# Patient Record
Sex: MALE | Race: WHITE | NOT HISPANIC OR LATINO
[De-identification: names, ages, dates, MRNs, and addresses within clinical notes are randomized per-mention and may not be internally consistent; named-entity substitution may affect disease eponyms.]

---

## 2017-05-26 ENCOUNTER — APPOINTMENT (OUTPATIENT)
Dept: DERMATOLOGY | Facility: CLINIC | Age: 82
End: 2017-05-26

## 2018-04-20 ENCOUNTER — APPOINTMENT (OUTPATIENT)
Dept: DERMATOLOGY | Facility: CLINIC | Age: 83
End: 2018-04-20
Payer: MEDICARE

## 2018-04-20 PROCEDURE — 99213 OFFICE O/P EST LOW 20 MIN: CPT | Mod: 25

## 2018-04-20 PROCEDURE — 17000 DESTRUCT PREMALG LESION: CPT

## 2018-10-22 ENCOUNTER — APPOINTMENT (OUTPATIENT)
Dept: DERMATOLOGY | Facility: CLINIC | Age: 83
End: 2018-10-22

## 2019-01-04 ENCOUNTER — APPOINTMENT (OUTPATIENT)
Dept: DERMATOLOGY | Facility: CLINIC | Age: 84
End: 2019-01-04
Payer: MEDICARE

## 2019-01-04 PROCEDURE — 17000 DESTRUCT PREMALG LESION: CPT

## 2019-01-04 PROCEDURE — 99213 OFFICE O/P EST LOW 20 MIN: CPT | Mod: 25

## 2019-01-04 PROCEDURE — 17003 DESTRUCT PREMALG LES 2-14: CPT

## 2019-07-12 ENCOUNTER — APPOINTMENT (OUTPATIENT)
Dept: DERMATOLOGY | Facility: CLINIC | Age: 84
End: 2019-07-12
Payer: MEDICARE

## 2019-07-12 PROCEDURE — 17000 DESTRUCT PREMALG LESION: CPT | Mod: 59

## 2019-07-12 PROCEDURE — 99214 OFFICE O/P EST MOD 30 MIN: CPT | Mod: 25

## 2019-07-12 PROCEDURE — 11102 TANGNTL BX SKIN SINGLE LES: CPT

## 2019-08-08 ENCOUNTER — APPOINTMENT (OUTPATIENT)
Dept: SURGICAL ONCOLOGY | Facility: CLINIC | Age: 84
End: 2019-08-08
Payer: MEDICARE

## 2019-08-08 VITALS
DIASTOLIC BLOOD PRESSURE: 93 MMHG | HEIGHT: 72 IN | SYSTOLIC BLOOD PRESSURE: 182 MMHG | BODY MASS INDEX: 28.34 KG/M2 | HEART RATE: 94 BPM | WEIGHT: 209.25 LBS

## 2019-08-08 DIAGNOSIS — Z87.891 PERSONAL HISTORY OF NICOTINE DEPENDENCE: ICD-10-CM

## 2019-08-08 PROCEDURE — 99214 OFFICE O/P EST MOD 30 MIN: CPT

## 2019-08-08 RX ORDER — LIDOCAINE HCL 4 %
250 MCG CREAM (GRAM) TOPICAL
Refills: 0 | Status: ACTIVE | COMMUNITY

## 2019-08-08 RX ORDER — CHLORHEXIDINE GLUCONATE 4 %
LIQUID (ML) TOPICAL
Refills: 0 | Status: ACTIVE | COMMUNITY

## 2019-08-08 RX ORDER — CRANBERRY FRUIT EXTRACT 200 MG
CAPSULE ORAL
Refills: 0 | Status: ACTIVE | COMMUNITY

## 2019-08-08 RX ORDER — BETHANECHOL CHLORIDE 25 MG/1
25 TABLET ORAL
Refills: 0 | Status: ACTIVE | COMMUNITY

## 2019-08-08 RX ORDER — ASCORBIC ACID 500 MG
500 POWDER IN PACKET (EA) ORAL
Refills: 0 | Status: ACTIVE | COMMUNITY

## 2019-08-08 RX ORDER — FINASTERIDE 5 MG/1
5 TABLET, FILM COATED ORAL
Refills: 0 | Status: ACTIVE | COMMUNITY

## 2019-09-06 ENCOUNTER — APPOINTMENT (OUTPATIENT)
Dept: SURGICAL ONCOLOGY | Facility: CLINIC | Age: 84
End: 2019-09-06
Payer: MEDICARE

## 2019-09-06 VITALS
SYSTOLIC BLOOD PRESSURE: 154 MMHG | BODY MASS INDEX: 28.31 KG/M2 | HEART RATE: 80 BPM | DIASTOLIC BLOOD PRESSURE: 86 MMHG | WEIGHT: 209 LBS | HEIGHT: 72 IN

## 2019-09-06 PROCEDURE — 14000 TIS TRNFR TRUNK 10 SQ CM/<: CPT

## 2019-09-06 NOTE — PROCEDURE
[Excision Of Lesion] : excision of lesion [Risks] : risks [Patient] : patient [Benefits] : benefits [Alternatives] : alternatives [Infection] : infection risk [Bleeding] : bleeding risk [___ ml Inj] : [unfilled] ~Uml [Consent Obtained] : written consent was obtained prior to the procedure [1%] : 1% [With Epi] : with epinephrine [Excisional: Margin ___mm] : the lesion was excised with a  [unfilled] ~Umm margin in an elliptical fashion [Simple Sutures] : simple sutures were used for the skin closure [Cautery] : cautery [Vicryl] : Vicryl suture(s) [Size: ___-0] : [unfilled]-0 [Nylon] : nylon suture(s) [Interrupted] : interrupted [Dressing Applied] : a sterile dressing was placed [Sent to Pathology] : the excised lesion was place in buffered formalin and sent for pathology [Tolerated Well] : the patient tolerated the procedure well [No Complications] : there were no complications [___ Week(s)] : in [unfilled] week(s) [de-identified] : invasive melanoma [FreeTextEntry5] : dorsal right forearm - 4 cm x 9 cm  [de-identified] : Chloraprep [de-identified] : 3 specimens sent to pathology [de-identified] : skin flaps raised medially and laterally - to ensure a tension-free closure of the dermis and epidermis

## 2019-09-06 NOTE — ASSESSMENT
[FreeTextEntry1] : 89 year old man s/p WLE of dorsal right forearm invasive melanoma 9/6/19. Tolerated procedure well, without complications.\par \par Right distal dorsal forearm shave biopsy- Invasive MELANOMA, favor lentigo maligna type, Breslow thickness 0.4 mm, non ulcerated, with regression changes. Melanoma in situ extends to the peripheral tissue edges of the specimen. Actinic keratosis. Less than one mitotic figure/mm2. Tumor stage: pT1a \par \par \par Plan:\par 1) Follow-up 9/26/19 for suture removal and pathology results.

## 2019-09-26 ENCOUNTER — APPOINTMENT (OUTPATIENT)
Dept: SURGICAL ONCOLOGY | Facility: CLINIC | Age: 84
End: 2019-09-26
Payer: MEDICARE

## 2019-09-26 VITALS
HEIGHT: 72 IN | SYSTOLIC BLOOD PRESSURE: 170 MMHG | WEIGHT: 209 LBS | DIASTOLIC BLOOD PRESSURE: 80 MMHG | BODY MASS INDEX: 28.31 KG/M2

## 2019-09-26 PROCEDURE — 99024 POSTOP FOLLOW-UP VISIT: CPT

## 2019-09-26 NOTE — HISTORY OF PRESENT ILLNESS
[de-identified] : 89 year old male presents for an initial post op visit.\par Initially consulted 8/8/19 for newly diagnosed right forearm melanoma, referred by Dr. Torres. \par He underwent a routine dermatological exam on 7/12/19 and Dr. Torres noticed a concerning mole with a surrounding brown patch and patient underwent a shave biopsy with the following results: \par \par Right distal dorsal forearm shave biopsy- Invasive MELANOMA, favor lentigo maligna type, Breslow thickness 0.4 mm, non ulcerated, with regression changes.  Melanoma in situ extends to the peripheral tissue edges of the specimen.  Actinic keratosis.  Less than one mitotic figure/mm2.  Tumor stage: pT1a \par \par Mr. PRAJAPATI reports a longstanding history of sun exposure without the use of sunblock. History of sunburns.  Denies any other concerning lesions or masses. Denies bleeding or pruritic moles. Denies pain or constitutional symptoms.\par \par History significant for an early stage melanoma of his right neck >30 years ago. \par \par PMH of strabismus as a child, cataract surgery 2012, Quadriceps repair @ age 87, Prostate surgery in 1996 for a benign growth, Tonsillectomy @ age 2.  Never a smoker.  Drinks daily martini and/or glass of wine. \par \par Denies family history of skin cancer or melanoma.\par \par \par INTERVAL HISTORY:\par *** In office procedure 9/6/19***- S/p WLE of right distal forearm melanoma.  \par FINAL PATHOLOGY revealed melanoma in situ, broad, with regression, negative margins.  Maximum tumor thickness measuring 0.4 mm. \par \par 9/26/19 - Doing well.  Had wound infection with redness, treated with antibiotics with resolution.

## 2019-09-26 NOTE — CONSULT LETTER
[Dear  ___] : Dear  [unfilled], [Please see my note below.] : Please see my note below. [Courtesy Letter:] : I had the pleasure of seeing your patient, [unfilled], in my office today. [Consult Closing:] : Thank you very much for allowing me to participate in the care of this patient.  If you have any questions, please do not hesitate to contact me. [FreeTextEntry3] : Milind Gong MD, MPH, FACS\par Surgical Oncology\par [facility] [place]\par Associate Professor of Surgery\par Department of General Surgery\par [employer] [facility]  [Sincerely,] : Sincerely,

## 2019-09-26 NOTE — PHYSICAL EXAM
[Normal] : supple, no neck mass and thyroid not enlarged [Normal Neck Lymph Nodes] : normal neck lymph nodes  [Normal Supraclavicular Lymph Nodes] : normal supraclavicular lymph nodes [Normal Groin Lymph Nodes] : normal groin lymph nodes [Normal Axillary Lymph Nodes] : normal axillary lymph nodes [Normal] : grossly intact [de-identified] : right forearm incision c/d/i; nylon sutures removed; no cellulitis or fluctuance

## 2019-09-26 NOTE — ASSESSMENT
[FreeTextEntry1] : IMP:\par Right forearm T1 (0.4 mm) melanoma\par *** In office procedure 9/6/19***- S/p WLE of right distal forearm melanoma.  \par FINAL PATHOLOGY revealed melanoma in situ, broad, with regression, negative margins.  Maximum tumor thickness measuring 0.4 mm. \par \par PLAN:\par Continue f/u with dermatology\par RTO 3 months \par \par \par \par

## 2019-10-14 NOTE — HISTORY OF PRESENT ILLNESS
[de-identified] : 89 year old male presents for an initial consultation for newly diagnosed right forearm melanoma, referred by Dr. Torres. \par He underwent a routine dermatological exam on 7/12/19 and Dr. Torres noticed a concerning mole with a surrounding brown patch and patient underwent a shave biopsy with the following results: \par \par Right distal dorsal forearm shave biopsy- Invasive MELANOMA, favor lentigo maligna type, Breslow thickness 0.4 mm, non ulcerated, with regression changes.  Melanoma in situ extends to the peripheral tissue edges of the specimen.  Actinic keratosis.  Less than one mitotic figure/mm2.  Tumor stage: pT1a \par \par Mr. PRAJAPATI reports a longstanding history of sun exposure without the use of sunblock. History of sunburns.  Denies any other concerning lesions or masses. Denies bleeding or pruritic moles. Denies pain or constitutional symptoms.\par \par History significant for an early stage melanoma of his right neck >30 years ago. \par \par PMH of strabismus as a child, cataract surgery 2012, Quadriceps repair @ age 87, Prostate surgery in 1996 for a benign growth, Tonsillectomy @ age 2.  Never a smoker.  Drinks daily martini and/or glass of wine. \par \par Denies family history of skin cancer or melanoma.

## 2019-10-14 NOTE — ASSESSMENT
[FreeTextEntry1] : IMP:\par Right forearm T1 (0.4 mm) melanoma\par \par PLAN:\par WLE of right forearm melanoma.  We discussed the risks, benefits, and alternatives of the procedure with the patient, he expressed understanding and agree to proceed.

## 2019-10-14 NOTE — REVIEW OF SYSTEMS
[Patient Intake Form Reviewed] : Patient intake form was reviewed [Negative] : Heme/Lymph [de-identified] : see HPI

## 2019-10-14 NOTE — PHYSICAL EXAM
[Normal] : supple, no neck mass and thyroid not enlarged [Normal Neck Lymph Nodes] : normal neck lymph nodes  [Normal Supraclavicular Lymph Nodes] : normal supraclavicular lymph nodes [Normal Axillary Lymph Nodes] : normal axillary lymph nodes [Normal] : oriented to person, place and time, with appropriate affect [de-identified] : healing right forearm shave biopsy site with scabbing

## 2020-01-17 ENCOUNTER — APPOINTMENT (OUTPATIENT)
Dept: DERMATOLOGY | Facility: CLINIC | Age: 85
End: 2020-01-17
Payer: MEDICARE

## 2020-01-17 PROCEDURE — 17000 DESTRUCT PREMALG LESION: CPT

## 2020-01-17 PROCEDURE — 17003 DESTRUCT PREMALG LES 2-14: CPT

## 2020-01-17 PROCEDURE — 99214 OFFICE O/P EST MOD 30 MIN: CPT | Mod: 25

## 2020-01-23 ENCOUNTER — APPOINTMENT (OUTPATIENT)
Dept: SURGICAL ONCOLOGY | Facility: CLINIC | Age: 85
End: 2020-01-23
Payer: MEDICARE

## 2020-01-23 VITALS
HEART RATE: 76 BPM | WEIGHT: 214.04 LBS | TEMPERATURE: 97.9 F | DIASTOLIC BLOOD PRESSURE: 82 MMHG | SYSTOLIC BLOOD PRESSURE: 155 MMHG | OXYGEN SATURATION: 95 % | BODY MASS INDEX: 28.99 KG/M2 | HEIGHT: 72 IN

## 2020-01-23 DIAGNOSIS — C43.61 MALIGNANT MELANOMA OF RIGHT UPPER LIMB, INCLUDING SHOULDER: ICD-10-CM

## 2020-01-23 PROCEDURE — 99214 OFFICE O/P EST MOD 30 MIN: CPT

## 2020-01-23 NOTE — HISTORY OF PRESENT ILLNESS
[de-identified] : 89 year old male presents for a 3 month follow up visit. \par Initially consulted 8/8/19 for newly diagnosed right forearm melanoma, referred by Dr. Torres. \par He underwent a routine dermatological exam on 7/12/19 and Dr. Torres noticed a concerning mole with a surrounding brown patch and patient underwent a shave biopsy with the following results: \par \par Right distal dorsal forearm shave biopsy- Invasive MELANOMA, favor lentigo maligna type, Breslow thickness 0.4 mm, non ulcerated, with regression changes.  Melanoma in situ extends to the peripheral tissue edges of the specimen.  Actinic keratosis.  Less than one mitotic figure/mm2.  Tumor stage: pT1a \par \par Mr. PRAJAPATI reports a longstanding history of sun exposure without the use of sunblock. History of sunburns.  Denies any other concerning lesions or masses. Denies bleeding or pruritic moles. Denies pain or constitutional symptoms.\par \par History significant for an early stage melanoma of his right neck >30 years ago. \par \par PMH of strabismus as a child, cataract surgery 2012, Quadriceps repair @ age 87, Prostate surgery in 1996 for a benign growth, Tonsillectomy @ age 2.  Never a smoker.  Drinks daily martini and/or glass of wine. \par \par Denies family history of skin cancer or melanoma.\par \par \par INTERVAL HISTORY:\par *** In office procedure 9/6/19***- S/p WLE of right distal forearm melanoma.  \par FINAL PATHOLOGY revealed melanoma in situ, broad, with regression, negative margins.  Maximum tumor thickness measuring 0.4 mm. \par \par 9/26/19 - Doing well.  Had wound infection with redness, treated with antibiotics with resolution.\par \par 1/23/2020- Continues f/u with Dr. Torres and was last seen on 1/17/2020 with no new biopsies.  Denies new skin lesions or masses. No bleeding or pruritic moles. Feeling well with no complaints.

## 2020-01-23 NOTE — ASSESSMENT
[FreeTextEntry1] : IMP:\par Right forearm T1 (0.4 mm) melanoma\par *** In office procedure 9/6/19***- S/p WLE of right distal forearm melanoma.  \par FINAL PATHOLOGY revealed melanoma in situ, broad, with regression, negative margins.  Maximum tumor thickness measuring 0.4 mm. \par No evidence of recurrence \par \par PLAN:\par Continue f/u with dermatology\par RTO 6 months\par \par \par

## 2020-01-23 NOTE — PHYSICAL EXAM
[Normal] : supple, no neck mass and thyroid not enlarged [Normal Neck Lymph Nodes] : normal neck lymph nodes  [Normal Axillary Lymph Nodes] : normal axillary lymph nodes [Normal Supraclavicular Lymph Nodes] : normal supraclavicular lymph nodes [Normal] : grossly intact [de-identified] : well healed right forearm incision with no evidence of recurrence

## 2020-01-23 NOTE — CONSULT LETTER
[Dear  ___] : Dear  [unfilled], [Courtesy Letter:] : I had the pleasure of seeing your patient, [unfilled], in my office today. [Please see my note below.] : Please see my note below. [Consult Closing:] : Thank you very much for allowing me to participate in the care of this patient.  If you have any questions, please do not hesitate to contact me. [Sincerely,] : Sincerely, [FreeTextEntry3] : Milind Gong MD, MPH, FACS\par Surgical Oncology\par [facility] [place]\par Associate Professor of Surgery\par Department of General Surgery\par [employer] at [facility]

## 2020-07-17 ENCOUNTER — APPOINTMENT (OUTPATIENT)
Dept: DERMATOLOGY | Facility: CLINIC | Age: 85
End: 2020-07-17

## 2020-10-15 ENCOUNTER — APPOINTMENT (OUTPATIENT)
Dept: ORTHOPEDIC SURGERY | Facility: CLINIC | Age: 85
End: 2020-10-15
Payer: MEDICARE

## 2020-10-15 VITALS
HEIGHT: 72 IN | BODY MASS INDEX: 28.99 KG/M2 | SYSTOLIC BLOOD PRESSURE: 150 MMHG | WEIGHT: 214 LBS | HEART RATE: 77 BPM | DIASTOLIC BLOOD PRESSURE: 78 MMHG

## 2020-10-15 DIAGNOSIS — M81.0 AGE-RELATED OSTEOPOROSIS W/OUT CURRENT PATHOLOGICAL FRACTURE: ICD-10-CM

## 2020-10-15 DIAGNOSIS — M47.816 SPONDYLOSIS W/OUT MYELOPATHY OR RADICULOPATHY, LUMBAR REGION: ICD-10-CM

## 2020-10-15 DIAGNOSIS — S22.080A WEDGE COMPRESSION FRACTURE OF T11-T12 VERTEBRA, INITIAL ENCOUNTER FOR CLOSED FRACTURE: ICD-10-CM

## 2020-10-15 PROCEDURE — 99204 OFFICE O/P NEW MOD 45 MIN: CPT

## 2020-10-15 NOTE — DISCUSSION/SUMMARY
[de-identified] : Pt has been referred to physical therapy for decreased pain modalities, core strengthening modalities, soft tissue modalities, and physical modalities. I advised the patient to get a bone density test to further assess his bone density and treat accordingly with PCP. Calcium supplements in addition to his vitamin D supplements were also discussed and advised. Patient to follow up on a PRN basis if pain returns, will order thoracic/lumbar MRI.

## 2020-10-15 NOTE — CONSULT LETTER
[Dear  ___] : Dear  [unfilled], [Consult Letter:] : I had the pleasure of evaluating your patient, [unfilled]. [Please see my note below.] : Please see my note below. [Sincerely,] : Sincerely, [Consult Closing:] : Thank you very much for allowing me to participate in the care of this patient.  If you have any questions, please do not hesitate to contact me. [FreeTextEntry3] : Gladis PATE

## 2020-10-15 NOTE — PHYSICAL EXAM
[Poor Appearance] : well-appearing [Acute Distress] : not in acute distress [Obese] : not obese [de-identified] : CONSTITUTIONAL: The patient is a very pleasant individual who is well-nourished and who appears stated age.\par PSYCHIATRIC: The patient is alert and oriented X 3 and in no apparent distress, and participates with orthopedic evaluation well.\par HEAD: Atraumatic and is nonsyndromic in appearance.\par EENT: No visible thyromegaly, EOMI.\par RESPIRATORY: Respiratory rate is regular, not dyspneic on examination.\par LYMPHATICS: There is no inguinal lymphadenopathy\par INTEGUMENTARY: Skin is clean, dry, and intact about the bilateral lower extremities and lumbar spine.\par VASCULAR: There is brisk capillary refill about the bilateral lower extremities.\par NEUROLOGIC: There are no pathologic reflexes. There is no decrease in sensation of the bilateral lower extremities on Wartenberg pinwheel examination. Deep tendon reflexes are well maintained at 2+/4 of the bilateral lower extremities and are symmetric.\par MUSCULOSKELETAL: There is no visible muscular atrophy. Manual motor strength is well maintained in the bilateral lower extremities. Range of motion of lumbar spine is well maintained. The patient ambulates in a non-myelopathic manner. Negative tension sign and straight leg raise bilaterally. Quad extension, ankle dorsiflexion, EHL, plantar flexion, and ankle eversion are well preserved. Normal secondary orthopaedic exam of bilateral hips, greater trochanteric area, knees and ankles \par \par left sided myositis, no tenderness with palpation along the lumbar spine.  [de-identified] : Xray of the lumbar spine taken lhr 8/21/2020 demonstrates diffuse age appropriate arthritic changes of the lower elements especially L3-L4 to L5-S1 and a mild T12 endplate compression fracture of indeterminant age.

## 2020-10-15 NOTE — HISTORY OF PRESENT ILLNESS
[de-identified] : 89 year old  M Presents for evaluation of an acute episode of left lower back pain. No accompanying pain radiation down the legs.  Patient has been going to the chiropractor for 40 years, after his most recent adjustment he began having severe pain. He visited his PCP Dr. Kenny Harrison who prescribed muscle relaxants and pain medication as well as advised him to undergo an Xray. He presents today for evaluation of [place] lumbar spine Xrays. He states he does stretches every morning.  [Ataxia] : no ataxia [Incontinence] : no incontinence [Urinary Ret.] : no urinary retention [Loss of Dexterity] : good dexterity

## 2020-10-15 NOTE — ADDENDUM
[FreeTextEntry1] : Documented by Cameron Das acting as a scribe for Gladis Cisse  on 08/20/2020. All medical record entries made by the Scribe were at my, Gladis Cisse , direction and personally dictated by me on 08/20/2020 . I have reviewed the chart and agree that the record accurately reflects my personal performance of the history, physical exam, assessment and plan. I have also personally directed, reviewed, and agreed with the chart.

## 2021-01-28 ENCOUNTER — APPOINTMENT (OUTPATIENT)
Dept: SURGICAL ONCOLOGY | Facility: CLINIC | Age: 86
End: 2021-01-28

## 2021-03-16 ENCOUNTER — APPOINTMENT (OUTPATIENT)
Dept: DERMATOLOGY | Facility: CLINIC | Age: 86
End: 2021-03-16
Payer: MEDICARE

## 2021-03-16 PROCEDURE — 11102 TANGNTL BX SKIN SINGLE LES: CPT | Mod: 59

## 2021-03-16 PROCEDURE — 17003 DESTRUCT PREMALG LES 2-14: CPT

## 2021-03-16 PROCEDURE — 17000 DESTRUCT PREMALG LESION: CPT | Mod: 59

## 2021-03-16 PROCEDURE — 99214 OFFICE O/P EST MOD 30 MIN: CPT | Mod: 25

## 2021-04-22 ENCOUNTER — LABORATORY RESULT (OUTPATIENT)
Age: 86
End: 2021-04-22

## 2021-04-22 ENCOUNTER — NON-APPOINTMENT (OUTPATIENT)
Age: 86
End: 2021-04-22

## 2021-04-22 ENCOUNTER — APPOINTMENT (OUTPATIENT)
Dept: DERMATOLOGY | Facility: CLINIC | Age: 86
End: 2021-04-22
Payer: MEDICARE

## 2021-04-22 DIAGNOSIS — C43.30 MALIGNANT MELANOMA OF UNSPECIFIED PART OF FACE: ICD-10-CM

## 2021-04-22 PROCEDURE — 11643 EXC F/E/E/N/L MAL+MRG 2.1-3: CPT

## 2021-04-22 PROCEDURE — 12052 INTMD RPR FACE/MM 2.6-5.0 CM: CPT

## 2021-04-30 ENCOUNTER — NON-APPOINTMENT (OUTPATIENT)
Age: 86
End: 2021-04-30

## 2021-08-31 ENCOUNTER — APPOINTMENT (OUTPATIENT)
Dept: DERMATOLOGY | Facility: CLINIC | Age: 86
End: 2021-08-31
Payer: MEDICARE

## 2021-08-31 PROCEDURE — 17000 DESTRUCT PREMALG LESION: CPT

## 2021-08-31 PROCEDURE — 17003 DESTRUCT PREMALG LES 2-14: CPT

## 2021-08-31 PROCEDURE — 99213 OFFICE O/P EST LOW 20 MIN: CPT | Mod: 25

## 2022-02-28 ENCOUNTER — APPOINTMENT (OUTPATIENT)
Dept: DERMATOLOGY | Facility: CLINIC | Age: 87
End: 2022-02-28
Payer: MEDICARE

## 2022-02-28 PROCEDURE — 17003 DESTRUCT PREMALG LES 2-14: CPT

## 2022-02-28 PROCEDURE — 99213 OFFICE O/P EST LOW 20 MIN: CPT | Mod: 25

## 2022-02-28 PROCEDURE — 17000 DESTRUCT PREMALG LESION: CPT

## 2022-08-29 ENCOUNTER — APPOINTMENT (OUTPATIENT)
Dept: DERMATOLOGY | Facility: CLINIC | Age: 87
End: 2022-08-29

## 2023-07-19 ENCOUNTER — APPOINTMENT (OUTPATIENT)
Dept: DERMATOLOGY | Facility: CLINIC | Age: 88
End: 2023-07-19
Payer: MEDICARE

## 2023-07-19 PROCEDURE — 99214 OFFICE O/P EST MOD 30 MIN: CPT

## 2023-08-17 ENCOUNTER — APPOINTMENT (OUTPATIENT)
Dept: DERMATOLOGY | Facility: CLINIC | Age: 88
End: 2023-08-17
Payer: MEDICARE

## 2023-08-17 PROCEDURE — 99212 OFFICE O/P EST SF 10 MIN: CPT | Mod: 25

## 2023-08-17 PROCEDURE — 17000 DESTRUCT PREMALG LESION: CPT

## 2023-08-17 PROCEDURE — 17003 DESTRUCT PREMALG LES 2-14: CPT

## 2024-02-20 ENCOUNTER — APPOINTMENT (OUTPATIENT)
Dept: DERMATOLOGY | Facility: CLINIC | Age: 89
End: 2024-02-20